# Patient Record
(demographics unavailable — no encounter records)

---

## 2025-02-27 NOTE — REASON FOR VISIT
[Follow-Up Visit] : a follow-up visit [FreeTextEntry2] : Jean Paul is a 11 yo with mild ASD, ADHD, and learning challenges seen for a follow-up visit to discuss progress and treatment recommendations.  [FreeTextEntry4] : None [FreeTextEntry1] : Parents, Jean Paul, LL - [phone] (Al[place]ian) [FreeTextEntry5] : medical records

## 2025-02-27 NOTE — PLAN
[FreeTextEntry3] :  - Letter written asking that he remain in similar setting with transition to 7th grade - Imperative that he does not get placed in class with behaviorally challenged students - Will get teacher rating scale and questionnaire completed to monitor progress in terms of academics and behavior - Discussed sensory strategies (e.g., smelling other things instead of people, self-hugs, use of tight blankets, etc.) - Discussed using a competing response to target skin-picking, can also use skin patches to prevent picking behavior - Encouraged incorporating Jean Paul in problem-solving - Because family is self-pay, will see annually and otherwise target any concerns/needs via e-mail/phone - Has seen genetics in the past and no testing was thought to be necessary, with advances in genetic testing - this may no longer be the case, may benefit TOBY, will discuss further at next visit - Provided with puberty toolkits in Kinyarwanda and English, discussed using clear language and visual aids when talking about puberty - Has OPWDD eligibility - Call prn - F/U in 1 year [Findings (To Date)] : Findings from evaluation (to date) [Clinical Basis] : Clinical basis for current diagnosis and clinical impressions [Behavior Modification] : Behavior modification strategies [Resources] : Other available resources [CSE / IEP] : Committee on Special Education (CSE) evaluations and Individualized Education Programs (IEP) [Family Questions] : Family's questions were addressed

## 2025-02-27 NOTE — HISTORY OF PRESENT ILLNESS
[FreeTextEntry5] : 6th grade [employer] (out-of-district) ST 2-3x/week OT 1x/week Self-contained special education classroom (Jean Paul says that there are around 5 students in the class) Gets home behavioral support 2x/week [FreeTextEntry1] : Jean Paul continues to make progress.   Jean Paul says that school is going well. He likes going to school. Jean Paul is in an out-of-district placement. He lives in the [place] [facility] district but gets services/placement through [place]. His IEP meeting will be in March-April 2024. Jean Paul would like to stay at his current school for 7th grade.   His teacher has noted some inattention and distractibility, but there has been improvement overall.   Jean Paul can sometimes laugh inappropriately. His home behavioral therapist is working on this. His mother is not sure if this is happening because he is anxious. Jean Paul is not sure why he is laughing - he says that he laughs when there is a funny joke. He says that he sometimes laughs because he feels uncomfortable.   He also likes to smell and hug people when it is not appropriate. He does this more at home where he is comfortable. He says that these sensory seeking behaviors help comfort him.   He can also get distressed when he gets pimples and will pick at them. He can also bite his nails.   Jean Paul's parents are also concerned because he often seems to prefer being on his own. He is often in his room. He does not like interacting with family.   He does have permanent OPWDD eligibility, but does not currently receive any services.   The family is currently self-pay because they have [facility] community plan and that is not accepted by our office. The family would like to keep this plan for now because other providers do take it.        [Major Illness] : no major illness [Surgery] : no surgery [Hospitalizations] : no hospitalizations

## 2025-02-27 NOTE — PLAN
[FreeTextEntry3] :  - Letter written asking that he remain in similar setting with transition to 7th grade - Imperative that he does not get placed in class with behaviorally challenged students - Will get teacher rating scale and questionnaire completed to monitor progress in terms of academics and behavior - Discussed sensory strategies (e.g., smelling other things instead of people, self-hugs, use of tight blankets, etc.) - Discussed using a competing response to target skin-picking, can also use skin patches to prevent picking behavior - Encouraged incorporating Jean Paul in problem-solving - Because family is self-pay, will see annually and otherwise target any concerns/needs via e-mail/phone - Has seen genetics in the past and no testing was thought to be necessary, with advances in genetic testing - this may no longer be the case, may benefit TOBY, will discuss further at next visit - Provided with puberty toolkits in Divehi and English, discussed using clear language and visual aids when talking about puberty - Has OPWDD eligibility - Call prn - F/U in 1 year [Findings (To Date)] : Findings from evaluation (to date) [Clinical Basis] : Clinical basis for current diagnosis and clinical impressions [Behavior Modification] : Behavior modification strategies [Resources] : Other available resources [CSE / IEP] : Committee on Special Education (CSE) evaluations and Individualized Education Programs (IEP) [Family Questions] : Family's questions were addressed

## 2025-02-27 NOTE — REASON FOR VISIT
[Follow-Up Visit] : a follow-up visit [FreeTextEntry2] : Jean Paul is a 11 yo with mild ASD, ADHD, and learning challenges seen for a follow-up visit to discuss progress and treatment recommendations.  [FreeTextEntry4] : None [FreeTextEntry1] : Parents, Jean Paul, LL - [phone] (Urdu) [FreeTextEntry5] : medical records

## 2025-02-27 NOTE — PHYSICAL EXAM
[Normal] : awake and interactive [Well-behaved during visit] : well-behaved during visit [Smiles responsively] : smiles responsively [Positive mood] : positive mood [Answered questions appropriately] : answered questions appropriately [de-identified] : Jean Paul did show some insight into his challenges.

## 2025-02-27 NOTE — HISTORY OF PRESENT ILLNESS
[FreeTextEntry5] : 6th grade [employer] (out-of-district) ST 2-3x/week OT 1x/week Self-contained special education classroom (Jean Paul says that there are around 5 students in the class) Gets home behavioral support 2x/week [FreeTextEntry1] : Jean Paul continues to make progress.   Jean Paul says that school is going well. He likes going to school. Jean Paul is in an out-of-district placement. He lives in the [facility] district but gets services/placement through [place]. His IEP meeting will be in March-April 2024. Jean Paul would like to stay at his current school for 7th grade.   His teacher has noted some inattention and distractibility, but there has been improvement overall.   Jean Paul can sometimes laugh inappropriately. His home behavioral therapist is working on this. His mother is not sure if this is happening because he is anxious. Jean Paul is not sure why he is laughing - he says that he laughs when there is a funny joke. He says that he sometimes laughs because he feels uncomfortable.   He also likes to smell and hug people when it is not appropriate. He does this more at home where he is comfortable. He says that these sensory seeking behaviors help comfort him.   He can also get distressed when he gets pimples and will pick at them. He can also bite his nails.   Jean Paul's parents are also concerned because he often seems to prefer being on his own. He is often in his room. He does not like interacting with family.   He does have permanent OPWDD eligibility, but does not currently receive any services.   The family is currently self-pay because they have [facility] community plan and that is not accepted by our office. The family would like to keep this plan for now because other providers do take it.        [Major Illness] : no major illness [Surgery] : no surgery [Hospitalizations] : no hospitalizations

## 2025-02-27 NOTE — PHYSICAL EXAM
[Normal] : awake and interactive [Well-behaved during visit] : well-behaved during visit [Smiles responsively] : smiles responsively [Positive mood] : positive mood [Answered questions appropriately] : answered questions appropriately [de-identified] : Jean Paul did show some insight into his challenges.